# Patient Record
Sex: MALE | Race: WHITE | Employment: FULL TIME | URBAN - NONMETROPOLITAN AREA
[De-identification: names, ages, dates, MRNs, and addresses within clinical notes are randomized per-mention and may not be internally consistent; named-entity substitution may affect disease eponyms.]

---

## 2021-01-18 ENCOUNTER — HOSPITAL ENCOUNTER (EMERGENCY)
Age: 21
Discharge: HOME | End: 2021-01-18

## 2021-01-18 VITALS
SYSTOLIC BLOOD PRESSURE: 152 MMHG | HEIGHT: 70 IN | RESPIRATION RATE: 16 BRPM | DIASTOLIC BLOOD PRESSURE: 94 MMHG | HEART RATE: 76 BPM | TEMPERATURE: 97.6 F | BODY MASS INDEX: 28.63 KG/M2 | OXYGEN SATURATION: 98 % | WEIGHT: 200 LBS

## 2021-01-18 DIAGNOSIS — S09.90XA CLOSED HEAD INJURY WITHOUT LOSS OF CONSCIOUSNESS, INITIAL ENCOUNTER: ICD-10-CM

## 2021-01-18 DIAGNOSIS — S01.01XA LACERATION OF SCALP, INITIAL ENCOUNTER: Primary | ICD-10-CM

## 2021-01-18 DIAGNOSIS — Z23 NEED FOR TETANUS BOOSTER: ICD-10-CM

## 2021-01-18 PROCEDURE — 99203 OFFICE O/P NEW LOW 30 MIN: CPT

## 2021-01-18 PROCEDURE — 6360000002 HC RX W HCPCS: Performed by: NURSE PRACTITIONER

## 2021-01-18 PROCEDURE — 90471 IMMUNIZATION ADMIN: CPT | Performed by: NURSE PRACTITIONER

## 2021-01-18 PROCEDURE — 90715 TDAP VACCINE 7 YRS/> IM: CPT | Performed by: NURSE PRACTITIONER

## 2021-01-18 PROCEDURE — 99202 OFFICE O/P NEW SF 15 MIN: CPT | Performed by: NURSE PRACTITIONER

## 2021-01-18 PROCEDURE — 12001 RPR S/N/AX/GEN/TRNK 2.5CM/<: CPT | Performed by: NURSE PRACTITIONER

## 2021-01-18 RX ADMIN — TETANUS TOXOID, REDUCED DIPHTHERIA TOXOID AND ACELLULAR PERTUSSIS VACCINE, ADSORBED 0.5 ML: 5; 2.5; 8; 8; 2.5 SUSPENSION INTRAMUSCULAR at 12:38

## 2021-01-18 ASSESSMENT — ENCOUNTER SYMPTOMS
NAUSEA: 0
VOMITING: 0
ROS SKIN COMMENTS: SCALP

## 2021-01-18 ASSESSMENT — PAIN DESCRIPTION - PAIN TYPE: TYPE: ACUTE PAIN

## 2021-01-18 ASSESSMENT — PAIN - FUNCTIONAL ASSESSMENT: PAIN_FUNCTIONAL_ASSESSMENT: ACTIVITIES ARE NOT PREVENTED

## 2021-01-18 ASSESSMENT — PAIN SCALES - GENERAL: PAINLEVEL_OUTOF10: 4

## 2021-01-18 ASSESSMENT — PAIN DESCRIPTION - DESCRIPTORS: DESCRIPTORS: ACHING;SHARP;THROBBING

## 2021-01-18 NOTE — ED NOTES
Pt does not want to wait the 15 minute shot time and provider notified. Provider ok with no wait.       Karen Lance RN  01/18/21 9264

## 2021-01-18 NOTE — ED PROVIDER NOTES
Neurological: Positive for headaches. Negative for dizziness and light-headedness. PAST MEDICAL HISTORY   History reviewed. No pertinent past medical history. SURGICALHISTORY     Patient  has no past surgical history on file. CURRENT MEDICATIONS       There are no discharge medications for this patient. ALLERGIES     Patient is has No Known Allergies. Patients   Immunization History   Administered Date(s) Administered    Tdap (Boostrix, Adacel) 01/18/2021       FAMILY HISTORY     Patient's family history is not on file. SOCIAL HISTORY     Patient  reports that he has never smoked. He has never used smokeless tobacco. He reports current alcohol use. He reports that he does not use drugs. PHYSICAL EXAM     ED TRIAGE VITALS  BP: (!) 152/94, Temp: 97.6 °F (36.4 °C), Pulse: 76, Resp: 16, SpO2: 98 %,Estimated body mass index is 28.7 kg/m² as calculated from the following:    Height as of this encounter: 5' 10\" (1.778 m). Weight as of this encounter: 200 lb (90.7 kg). ,No LMP for male patient. Physical Exam  Vitals signs and nursing note reviewed. Constitutional:       General: He is awake. He is not in acute distress. Appearance: Normal appearance. He is well-developed. He is not ill-appearing, toxic-appearing or diaphoretic. HENT:      Head: Normocephalic. Laceration present. No raccoon eyes or Rhodes's sign. Comments: 2 cm scalp laceration noted patient has avulsion type laceration bleeding is controlled     Right Ear: Tympanic membrane, ear canal and external ear normal. No mastoid tenderness. No hemotympanum. Left Ear: Tympanic membrane, ear canal and external ear normal. No mastoid tenderness. No hemotympanum. Nose: Nose normal.   Eyes:      General: Lids are normal.      Extraocular Movements: Extraocular movements intact. Pupils: Pupils are equal, round, and reactive to light.    Neck: Musculoskeletal: Normal range of motion. Normal range of motion. No neck rigidity or injury. Cardiovascular:      Rate and Rhythm: Normal rate. Pulmonary:      Effort: Pulmonary effort is normal.   Musculoskeletal:         General: Tenderness and signs of injury present. No swelling. Comments: Scalp laceration   Skin:     General: Skin is warm and dry. Capillary Refill: Capillary refill takes less than 2 seconds. Findings: Laceration and wound present. Comments: Scalp laceration   Neurological:      Mental Status: He is alert and oriented to person, place, and time. GCS: GCS eye subscore is 4. GCS verbal subscore is 5. GCS motor subscore is 6. Cranial Nerves: Cranial nerves are intact. Sensory: Sensation is intact. No sensory deficit. Motor: Motor function is intact. Psychiatric:         Mood and Affect: Mood normal.         Behavior: Behavior normal. Behavior is cooperative. DIAGNOSTIC RESULTS     Labs:No results found for this visit on 01/18/21. IMAGING:    No orders to display         EKG:      URGENT CARE COURSE:     Vitals:    01/18/21 1211   BP: (!) 152/94   Pulse: 76   Resp: 16   Temp: 97.6 °F (36.4 °C)   TempSrc: Temporal   SpO2: 98%   Weight: 200 lb (90.7 kg)   Height: 5' 10\" (1.778 m)       Medications   Tetanus-Diphth-Acell Pertussis (BOOSTRIX) injection 0.5 mL (0.5 mLs Intramuscular Given 1/18/21 1238)            PROCEDURES:  Lac Repair    Date/Time: 1/18/2021 12:20 PM  Performed by: ALESSANDRO Martinez CNP  Authorized by: ALESSANDRO Martinez CNP     Consent:     Consent obtained:  Verbal    Consent given by:  Patient    Risks discussed:  Pain and retained foreign body  Anesthesia (see MAR for exact dosages):      Anesthesia method:  None (Patient declined local anesthetic)  Laceration details:     Location:  Scalp    Scalp location:  Frontal    Length (cm):  2    Depth (mm):  2  Repair type:     Repair type:  Simple Pre-procedure details:     Preparation:  Patient was prepped and draped in usual sterile fashion  Exploration:     Wound extent: no foreign bodies/material noted, no muscle damage noted, no nerve damage noted and no underlying fracture noted      Contaminated: no    Treatment:     Area cleansed with:  Benita-Clens    Amount of cleaning:  Standard    Visualized foreign bodies/material removed: no    Skin repair:     Repair method:  Staples    Number of staples:  5  Approximation:     Approximation:  Close  Post-procedure details:     Dressing:  Open (no dressing)    Patient tolerance of procedure: Tolerated well, no immediate complications  Comments:      (See photos)          2 cm scalp laceration to the left scalp just above the hairline            5 staples applied bleeding is controlled patient tolerated well          FINAL IMPRESSION      1. Laceration of scalp, initial encounter    2. Closed head injury without loss of consciousness, initial encounter    3. Need for tetanus booster          DISPOSITION/ PLAN        Rest is the best treatment. Get plenty of sleep at night. And try to rest during the day. If you would find it harder to think, concentrate, remember or solve problems. You  may also develop persistent headaches, have changes in your sleep patterns such as not being able to sleep or sleeping all the time. 1.Headache    2. Dizziness   3. Loss of consciousness   4. Short term memory loss   5. Dilated pupils   6. Visual impairment   7. Convulsions    8. Nausea and vomiting    9. Speech and language problems    10. Emotional and behavioral changes Go slowly from lying sitting and standing because you may become dizzy,lightheaded,or unsteady. The following activities that exacerbate symptoms Avoid activities that are physically or mentally demanding. These include housework, exercise, and schoolwork. And don't play video games, send text messages, or use the computer. Do not drink alcohol or use illegal drugs. Take an over-the-counter pain medicine, such as acetaminophen (Tylenol), ibuprofen (Advil, Motrin), or naproxen (Aleve). Be safe with medicines. He was also advised toKeep clean and dry,Monitor for redness, drainage, pain have the Staples out in 7days     You need to down 911 or you need to be reevaluated in the emergency department. Follow up with your primary care provider in the next 24-48 hours for reevaluation. PATIENT REFERRED TO:  No primary care provider on file. No primary physician on file. DISCHARGE MEDICATIONS:  There are no discharge medications for this patient. There are no discharge medications for this patient. There are no discharge medications for this patient.       ALESSANDRO Holland CNP    (Please note that portions of this note were completed with a voice recognition program. Efforts were made to edit the dictations but occasionally words are mis-transcribed.)            ALESSANDRO Holland CNP  01/18/21 1300